# Patient Record
Sex: MALE | Race: OTHER | NOT HISPANIC OR LATINO
[De-identification: names, ages, dates, MRNs, and addresses within clinical notes are randomized per-mention and may not be internally consistent; named-entity substitution may affect disease eponyms.]

---

## 2022-05-04 ENCOUNTER — APPOINTMENT (OUTPATIENT)
Dept: HEART AND VASCULAR | Facility: CLINIC | Age: 62
End: 2022-05-04
Payer: COMMERCIAL

## 2022-05-04 ENCOUNTER — NON-APPOINTMENT (OUTPATIENT)
Age: 62
End: 2022-05-04

## 2022-05-04 VITALS
TEMPERATURE: 36.3 F | SYSTOLIC BLOOD PRESSURE: 137 MMHG | OXYGEN SATURATION: 98 % | HEIGHT: 66 IN | WEIGHT: 179.99 LBS | DIASTOLIC BLOOD PRESSURE: 73 MMHG | BODY MASS INDEX: 28.93 KG/M2 | HEART RATE: 104 BPM

## 2022-05-04 DIAGNOSIS — E78.00 PURE HYPERCHOLESTEROLEMIA, UNSPECIFIED: ICD-10-CM

## 2022-05-04 DIAGNOSIS — Z80.52 FAMILY HISTORY OF MALIGNANT NEOPLASM OF BLADDER: ICD-10-CM

## 2022-05-04 DIAGNOSIS — G47.33 OBSTRUCTIVE SLEEP APNEA (ADULT) (PEDIATRIC): ICD-10-CM

## 2022-05-04 DIAGNOSIS — L12.0 BULLOUS PEMPHIGOID: ICD-10-CM

## 2022-05-04 DIAGNOSIS — R73.03 PREDIABETES.: ICD-10-CM

## 2022-05-04 DIAGNOSIS — G47.30 SLEEP APNEA, UNSPECIFIED: ICD-10-CM

## 2022-05-04 DIAGNOSIS — Z80.42 FAMILY HISTORY OF MALIGNANT NEOPLASM OF PROSTATE: ICD-10-CM

## 2022-05-04 DIAGNOSIS — Z80.9 FAMILY HISTORY OF MALIGNANT NEOPLASM, UNSPECIFIED: ICD-10-CM

## 2022-05-04 DIAGNOSIS — Z78.9 OTHER SPECIFIED HEALTH STATUS: ICD-10-CM

## 2022-05-04 DIAGNOSIS — Z82.49 FAMILY HISTORY OF ISCHEMIC HEART DISEASE AND OTHER DISEASES OF THE CIRCULATORY SYSTEM: ICD-10-CM

## 2022-05-04 DIAGNOSIS — F32.A DEPRESSION, UNSPECIFIED: ICD-10-CM

## 2022-05-04 DIAGNOSIS — I25.10 ATHEROSCLEROTIC HEART DISEASE OF NATIVE CORONARY ARTERY W/OUT ANGINA PECTORIS: ICD-10-CM

## 2022-05-04 DIAGNOSIS — Z72.3 LACK OF PHYSICAL EXERCISE: ICD-10-CM

## 2022-05-04 PROBLEM — Z00.00 ENCOUNTER FOR PREVENTIVE HEALTH EXAMINATION: Status: ACTIVE | Noted: 2022-05-04

## 2022-05-04 PROCEDURE — 36415 COLL VENOUS BLD VENIPUNCTURE: CPT

## 2022-05-04 PROCEDURE — 93000 ELECTROCARDIOGRAM COMPLETE: CPT

## 2022-05-04 PROCEDURE — 99203 OFFICE O/P NEW LOW 30 MIN: CPT

## 2022-05-04 RX ORDER — HALOBETASOL PROPIONATE 0.5 MG/G
0.05 CREAM TOPICAL TWICE DAILY
Refills: 0 | Status: ACTIVE | COMMUNITY

## 2022-05-04 RX ORDER — PREDNISONE 10 MG
TABLET ORAL DAILY
Refills: 0 | Status: ACTIVE | COMMUNITY

## 2022-05-04 NOTE — ASSESSMENT
[FreeTextEntry1] : ASHD- By [facility] 7 y/a.\par \par HLD- Diet reviewed, Labs were drawn today in Office. \par \par Borderline HTN-  May be due to high dose Prednisone.  Non Pharm approach discussed\par \par Prediabetes- diet reviewed

## 2022-05-04 NOTE — PHYSICAL EXAM

## 2022-05-04 NOTE — REASON FOR VISIT
[FreeTextEntry1] : 62 y/o M last seen here 7 yrs ago.  CCS of 23, 61st % ile at that time. Pt has TAPAN, a tremor and depression.  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  Lives  with mother.  Had 2 boosters.  Very anxious about health.  In May 2021 A1c 6.0, .  Pt gets 1 sec twinges of CP.\par Has very poor sleep habits.  Poor structure to his life, currently working from home.\par \par \par \par EKG: NSR, normal axis and intervals, no ST-Tw abnormalities. 5/4/22

## 2022-05-05 LAB
24R-OH-CALCIDIOL SERPL-MCNC: 94.3 PG/ML
25(OH)D3 SERPL-MCNC: 29.1 NG/ML
ALBUMIN SERPL ELPH-MCNC: 4.4 G/DL
ALP BLD-CCNC: 35 U/L
ALT SERPL-CCNC: 13 U/L
ANION GAP SERPL CALC-SCNC: 12 MMOL/L
AST SERPL-CCNC: 15 U/L
BASOPHILS # BLD AUTO: 0.07 K/UL
BASOPHILS NFR BLD AUTO: 0.5 %
BILIRUB SERPL-MCNC: 0.2 MG/DL
BUN SERPL-MCNC: 20 MG/DL
CALCIUM SERPL-MCNC: 9.7 MG/DL
CHLORIDE SERPL-SCNC: 102 MMOL/L
CHOLEST SERPL-MCNC: 202 MG/DL
CO2 SERPL-SCNC: 26 MMOL/L
CREAT SERPL-MCNC: 1.35 MG/DL
EGFR: 60 ML/MIN/1.73M2
EOSINOPHIL # BLD AUTO: 0.01 K/UL
EOSINOPHIL NFR BLD AUTO: 0.1 %
ESTIMATED AVERAGE GLUCOSE: 128 MG/DL
GLUCOSE SERPL-MCNC: 111 MG/DL
HBA1C MFR BLD HPLC: 6.1 %
HCT VFR BLD CALC: 48.9 %
HDLC SERPL-MCNC: 55 MG/DL
HGB BLD-MCNC: 15.5 G/DL
IMM GRANULOCYTES NFR BLD AUTO: 1.1 %
LDLC SERPL CALC-MCNC: 101 MG/DL
LYMPHOCYTES # BLD AUTO: 0.85 K/UL
LYMPHOCYTES NFR BLD AUTO: 6.3 %
MAN DIFF?: NORMAL
MCHC RBC-ENTMCNC: 29.8 PG
MCHC RBC-ENTMCNC: 31.7 GM/DL
MCV RBC AUTO: 94 FL
MONOCYTES # BLD AUTO: 0.57 K/UL
MONOCYTES NFR BLD AUTO: 4.2 %
NEUTROPHILS # BLD AUTO: 11.95 K/UL
NEUTROPHILS NFR BLD AUTO: 87.8 %
NONHDLC SERPL-MCNC: 147 MG/DL
PLATELET # BLD AUTO: 330 K/UL
POTASSIUM SERPL-SCNC: 4.6 MMOL/L
PROT SERPL-MCNC: 7.1 G/DL
RBC # BLD: 5.2 M/UL
RBC # FLD: 12.6 %
SODIUM SERPL-SCNC: 140 MMOL/L
TRIGL SERPL-MCNC: 228 MG/DL
WBC # FLD AUTO: 13.6 K/UL

## 2022-06-14 ENCOUNTER — APPOINTMENT (OUTPATIENT)
Dept: HEART AND VASCULAR | Facility: CLINIC | Age: 62
End: 2022-06-14

## 2022-06-15 ENCOUNTER — APPOINTMENT (OUTPATIENT)
Dept: HEART AND VASCULAR | Facility: CLINIC | Age: 62
End: 2022-06-15

## 2022-10-26 ENCOUNTER — APPOINTMENT (OUTPATIENT)
Dept: HEART AND VASCULAR | Facility: CLINIC | Age: 62
End: 2022-10-26

## 2022-10-26 PROCEDURE — ZZZZZ: CPT

## 2022-10-26 PROCEDURE — 93015 CV STRESS TEST SUPVJ I&R: CPT

## 2022-10-26 PROCEDURE — 93306 TTE W/DOPPLER COMPLETE: CPT

## 2022-10-31 ENCOUNTER — APPOINTMENT (OUTPATIENT)
Dept: OTOLARYNGOLOGY | Facility: CLINIC | Age: 62
End: 2022-10-31

## 2022-10-31 VITALS
BODY MASS INDEX: 27.47 KG/M2 | WEIGHT: 175 LBS | SYSTOLIC BLOOD PRESSURE: 116 MMHG | HEIGHT: 67 IN | DIASTOLIC BLOOD PRESSURE: 70 MMHG | HEART RATE: 93 BPM

## 2022-10-31 DIAGNOSIS — H61.21 IMPACTED CERUMEN, RIGHT EAR: ICD-10-CM

## 2022-10-31 PROCEDURE — 92550 TYMPANOMETRY & REFLEX THRESH: CPT | Mod: 52

## 2022-10-31 PROCEDURE — 92557 COMPREHENSIVE HEARING TEST: CPT

## 2022-10-31 PROCEDURE — G0268 REMOVAL OF IMPACTED WAX MD: CPT | Mod: RT

## 2022-10-31 PROCEDURE — 99203 OFFICE O/P NEW LOW 30 MIN: CPT | Mod: 25

## 2022-10-31 NOTE — PHYSICAL EXAM
[Midline] : trachea located in midline position [Normal] : no rashes [de-identified] : right impacted cerumen removed without issue

## 2022-10-31 NOTE — PROCEDURE
[FreeTextEntry3] : -\par Cerumen Removal/Ear Cleaning for Otitis Externa\par Pre-operative Diagnosis: right Cerumen Impaction\par Post-operative Diagnosis: Same\par Procedure: Binocular microscopy with cerumen removal- 50905\par Procedure Details: \par The patient was placed in the supine position. The operating microscope was positioned. I then placed the ear speculum in the EAC. Cerumen was then removed using a mixture of otologic curettes, and suction. The TM was noted to be intact. The patient tolerated procedure well.\par \par Findings: \par Bilateral Ear Canal - normal\par Bilateral Tympanic Membrane - normal\par \par Recommendations: Debrox\par Complications: None\par \par

## 2022-10-31 NOTE — ASSESSMENT
[FreeTextEntry1] : 62 year old male presents with bilateral otalgia,   Worse on the left.   he also has bilateral nonpulsatile tinnitus.  On exam there was evidence of some cerumen on the right side.  This was cleaned away without issue.  Exam otherwise was normal.  At this time I believe that this is either consistent with eustachian tube dysfunction versus TMJ.  We will plan audiogram and tympanogram for further evaluation and follow-up after to review those results.\par \par –Audiogram and tympanogram\par – Follow-up after the above, sooner should symptoms worsen or fail to improve

## 2022-10-31 NOTE — HISTORY OF PRESENT ILLNESS
[de-identified] : 10/31/22\par 62M presents with bilateral otalgia, worse on left for 4 months. He also reports ear "tickling." The pain is sore, achy and constant. Not worse at anytime of the day. He reports decreased hearing and tinnitus as well. Denies otorrhea or vertiginous symptoms. + q tip use. He did try to use ear wax removal kit at home with no relief. He reports a flea infestation in his house and was bitten around his ear and he is wondering if this is related. No recent dental work done.

## 2022-11-10 ENCOUNTER — APPOINTMENT (OUTPATIENT)
Dept: OTOLARYNGOLOGY | Facility: CLINIC | Age: 62
End: 2022-11-10

## 2022-11-14 PROBLEM — H92.03 OTALGIA OF BOTH EARS: Status: ACTIVE | Noted: 2022-10-31

## 2022-11-14 PROBLEM — H93.13 BILATERAL NONPULSATILE TINNITUS: Status: ACTIVE | Noted: 2022-10-31

## 2022-11-15 ENCOUNTER — APPOINTMENT (OUTPATIENT)
Dept: OTOLARYNGOLOGY | Facility: CLINIC | Age: 62
End: 2022-11-15

## 2022-11-15 VITALS
BODY MASS INDEX: 28.12 KG/M2 | DIASTOLIC BLOOD PRESSURE: 85 MMHG | TEMPERATURE: 97.2 F | HEART RATE: 100 BPM | WEIGHT: 175 LBS | SYSTOLIC BLOOD PRESSURE: 128 MMHG | HEIGHT: 66 IN

## 2022-11-15 DIAGNOSIS — H92.03 OTALGIA, BILATERAL: ICD-10-CM

## 2022-11-15 DIAGNOSIS — H93.13 TINNITUS, BILATERAL: ICD-10-CM

## 2022-11-15 PROCEDURE — 99214 OFFICE O/P EST MOD 30 MIN: CPT

## 2022-11-15 NOTE — HISTORY OF PRESENT ILLNESS
[de-identified] : 10/31/22\par 62M presents with bilateral otalgia, worse on left for 4 months. He also reports ear "tickling." The pain is sore, achy and constant. Not worse at anytime of the day. He reports decreased hearing and tinnitus as well. Denies otorrhea or vertiginous symptoms. + q tip use. He did try to use ear wax removal kit at home with no relief. He reports a flea infestation in his house and was bitten around his ear and he is wondering if this is related. No recent dental work done. \par - [FreeTextEntry1] : 11/15/22\par No change in symptoms.  No new ENT issues.   He did complete audio/tymp and presents to review.

## 2022-11-15 NOTE — ASSESSMENT
[FreeTextEntry1] : 62 year old male presents with bilateral otalgia,   Worse on the left.   He also has bilateral nonpulsatile tinnitus.  On exam there was evidence of some cerumen on the right side.  This was cleaned away without issue.  Exam otherwise was normal.  At this time I believe that this is either consistent with eustachian tube dysfunction versus TMJ.  We will plan audiogram and tympanogram for further evaluation and follow-up after to review those results.\par \par Audio/tymp completed:Hearing: HWNL sloping to mild SNHL AU; Tymps: Type A AD, Type As AS. \par At this time I suspect TMJ and recommend consultation with Oral Surgery.  TMJ handout given.  \par Pt remained unsure this was dental in nature, so I advised he can try nasal saline, and nasal steroids, ETD handout given.  If he would like we can repeat audio/tymp with specific Eustachian tube testing.\par \par Yearly Audio/tymp due to finding of SNHL. \par \par – Annual audiogram and tympanogram\par – fu with dentistry, TMJ handout given\par – Follow-up after the above, sooner should symptoms worsen or fail to improve\par – Eustachian tube dysfunction handout given\par – Nasal saline, nasal steroids.\par – If symptoms persist can try specific eustachian tube testing

## 2022-11-15 NOTE — DATA REVIEWED
[de-identified] : 10/31/2022\par \par Summary\par Hearing: HWNL sloping to mild SNHL AU\par Tymps: Type A AD, Type As AS. \par

## 2025-01-29 ENCOUNTER — APPOINTMENT (OUTPATIENT)
Dept: OTOLARYNGOLOGY | Facility: CLINIC | Age: 65
End: 2025-01-29

## 2025-04-17 ENCOUNTER — APPOINTMENT (OUTPATIENT)
Dept: HEART AND VASCULAR | Facility: CLINIC | Age: 65
End: 2025-04-17
Payer: COMMERCIAL

## 2025-04-17 ENCOUNTER — NON-APPOINTMENT (OUTPATIENT)
Age: 65
End: 2025-04-17

## 2025-04-17 VITALS
HEIGHT: 66 IN | DIASTOLIC BLOOD PRESSURE: 81 MMHG | TEMPERATURE: 97.7 F | SYSTOLIC BLOOD PRESSURE: 128 MMHG | WEIGHT: 189 LBS | BODY MASS INDEX: 30.37 KG/M2 | HEART RATE: 84 BPM | OXYGEN SATURATION: 98 %

## 2025-04-17 DIAGNOSIS — I25.10 ATHEROSCLEROTIC HEART DISEASE OF NATIVE CORONARY ARTERY W/OUT ANGINA PECTORIS: ICD-10-CM

## 2025-04-17 DIAGNOSIS — Z86.39 PERSONAL HISTORY OF OTHER ENDOCRINE, NUTRITIONAL AND METABOLIC DISEASE: ICD-10-CM

## 2025-04-17 DIAGNOSIS — E78.00 PURE HYPERCHOLESTEROLEMIA, UNSPECIFIED: ICD-10-CM

## 2025-04-17 DIAGNOSIS — R73.03 PREDIABETES.: ICD-10-CM

## 2025-04-17 DIAGNOSIS — I10 ESSENTIAL (PRIMARY) HYPERTENSION: ICD-10-CM

## 2025-04-17 PROCEDURE — 93015 CV STRESS TEST SUPVJ I&R: CPT

## 2025-04-17 PROCEDURE — 99214 OFFICE O/P EST MOD 30 MIN: CPT

## 2025-04-17 RX ORDER — LOSARTAN POTASSIUM 25 MG/1
25 TABLET, FILM COATED ORAL
Qty: 90 | Refills: 3 | Status: ACTIVE | COMMUNITY
Start: 2025-04-17 | End: 1900-01-01

## 2025-04-17 RX ORDER — ZOLPIDEM TARTRATE 12.5 MG/1
12.5 TABLET, COATED ORAL
Refills: 0 | Status: ACTIVE | COMMUNITY